# Patient Record
Sex: FEMALE | Race: OTHER | HISPANIC OR LATINO | URBAN - METROPOLITAN AREA
[De-identification: names, ages, dates, MRNs, and addresses within clinical notes are randomized per-mention and may not be internally consistent; named-entity substitution may affect disease eponyms.]

---

## 2021-07-21 ENCOUNTER — EMERGENCY (EMERGENCY)
Facility: HOSPITAL | Age: 36
LOS: 1 days | Discharge: HOME | End: 2021-07-21
Attending: EMERGENCY MEDICINE | Admitting: EMERGENCY MEDICINE
Payer: MEDICARE

## 2021-07-21 VITALS
DIASTOLIC BLOOD PRESSURE: 56 MMHG | SYSTOLIC BLOOD PRESSURE: 112 MMHG | HEART RATE: 83 BPM | TEMPERATURE: 97 F | OXYGEN SATURATION: 100 % | RESPIRATION RATE: 18 BRPM

## 2021-07-21 PROCEDURE — 99283 EMERGENCY DEPT VISIT LOW MDM: CPT

## 2021-07-21 RX ORDER — IBUPROFEN 200 MG
400 TABLET ORAL ONCE
Refills: 0 | Status: COMPLETED | OUTPATIENT
Start: 2021-07-21 | End: 2021-07-21

## 2021-07-21 RX ORDER — ACETAMINOPHEN 500 MG
650 TABLET ORAL ONCE
Refills: 0 | Status: COMPLETED | OUTPATIENT
Start: 2021-07-21 | End: 2021-07-21

## 2021-07-21 RX ADMIN — Medication 400 MILLIGRAM(S): at 14:42

## 2021-07-21 RX ADMIN — Medication 650 MILLIGRAM(S): at 14:42

## 2021-07-21 NOTE — ED PROVIDER NOTE - PHYSICAL EXAMINATION
Gen: AAOx3, non-toxic  Head: NCAT  HEENT: left eye upper and lower eyelid with ecchymosis, smooth eye movement, no entrapment, no corneal injury on wood's lamp exam.   Lung: CTAB, no respiratory distress, no wheezes/rhonchi/rales B/L, speaking in full sentences  CV: RRR, no murmurs, rubs or gallops  Abd: soft, NTND, no guarding, no CVA tenderness  MSK: no visible deformities  Neuro: No focal sensory or motor deficits, normal CN exam   Skin: Warm, well perfused, no rash  Psych: normal affect.
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
## 2021-07-21 NOTE — ED PROVIDER NOTE - PATIENT PORTAL LINK FT
You can access the FollowMyHealth Patient Portal offered by [facility] by registering at the following website: [email or web address]. By joining [email or web address] [email or web address] portal, you will also be able to view your health information using other applications (apps) compatible with our system.
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
## 2021-07-21 NOTE — ED PROVIDER NOTE - CLINICAL SUMMARY MEDICAL DECISION MAKING FREE TEXT BOX
24 yo M with no pmh, , presents after noticing left hand blisters with no fevers chills or discharge3 <1cm ruptured blisters on the L palm with no discharge or surrounding erythema. FROM. left eye upper and lower eyelid with ecchymosis, smooth eye movement, no entrapment, no corneal injury on wood's lamp exam.

## 2021-07-21 NOTE — ED PROVIDER NOTE - NS ED ROS FT
GENERAL: No fever or chills  EYES: see hpi  HEENT: no trouble swallowing or speaking  CARDIAC: no chest pain or palpiations   PULMONARY: no cough or SOB  GI: no abdominal pain, nausea, vomiting, diarrhea, or constipation   : No changes in urination  SKIN: no rashes  NEURO: no headache, numbness, or weakness.  MSK: No joint pain
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
## 2021-07-21 NOTE — ED PROVIDER NOTE - NSFOLLOWUPCLINICS_GEN_ALL_ED_FT
[facility]  Ophthalmology  [address] [place] [address], [facility] [address]  [place] [address]  Phone: [phone]  Fax:

## 2021-12-13 ENCOUNTER — EMERGENCY (EMERGENCY)
Facility: HOSPITAL | Age: 36
LOS: 1 days | Discharge: HOME | End: 2021-12-13
Attending: STUDENT IN AN ORGANIZED HEALTH CARE EDUCATION/TRAINING PROGRAM | Admitting: STUDENT IN AN ORGANIZED HEALTH CARE EDUCATION/TRAINING PROGRAM
Payer: MEDICARE

## 2021-12-13 VITALS
DIASTOLIC BLOOD PRESSURE: 79 MMHG | TEMPERATURE: 98 F | OXYGEN SATURATION: 100 % | HEART RATE: 83 BPM | SYSTOLIC BLOOD PRESSURE: 121 MMHG | RESPIRATION RATE: 15 BRPM

## 2021-12-13 PROCEDURE — 99283 EMERGENCY DEPT VISIT LOW MDM: CPT | Mod: GC

## 2021-12-13 NOTE — ED ADULT TRIAGE NOTE - CHIEF COMPLAINT QUOTE
Pt coming in from residential home accompanied by counselor. Pt. c/o dry rash to Left thigh. Denies any open skin or pain. PHx Down Syndrome and GERD.

## 2021-12-14 VITALS
HEART RATE: 83 BPM | OXYGEN SATURATION: 98 % | RESPIRATION RATE: 16 BRPM | DIASTOLIC BLOOD PRESSURE: 41 MMHG | SYSTOLIC BLOOD PRESSURE: 119 MMHG

## 2021-12-14 RX ORDER — CEPHALEXIN 500 MG
1 CAPSULE ORAL
Qty: 14 | Refills: 0
Start: 2021-12-14 | End: 2021-12-20

## 2021-12-14 RX ORDER — CEPHALEXIN 500 MG
500 CAPSULE ORAL ONCE
Refills: 0 | Status: COMPLETED | OUTPATIENT
Start: 2021-12-14 | End: 2021-12-14

## 2021-12-14 RX ADMIN — Medication 500 MILLIGRAM(S): at 00:41

## 2021-12-14 NOTE — ED PROVIDER NOTE - OBJECTIVE STATEMENT
36F pmhx of down syndrome and GERD presenting with CC of redness over the posterior red hip. No known injury, fall prior to onset. No fevers, chills, PT is ambulatory, not bed bound. No pain to the area. 36F pmhx of down syndrome and GERD presenting with CC of redness over the posterior red hip. No known injury, fall prior to onset. No fevers, chills, PT is ambulatory, not bed bound. No pain to the area. Pt poor historian and all history obtained by caregiver at bedside. No loss of function. No fevers or chills. No known trauma.

## 2021-12-14 NOTE — ED PROVIDER NOTE - CLINICAL SUMMARY MEDICAL DECISION MAKING FREE TEXT BOX
36F presenting with redness/irritated skin to the L posterior hip. On exam, VSS w early cellulitic changes on bedside US. Will tx w abx and give follow up instructions and return precautions. Daysi Smith, EM PGY3

## 2021-12-14 NOTE — ED ADULT NURSE NOTE - OBJECTIVE STATEMENT
patient aaox3. ambulatory. pmhx of down syndrome and GERD. presents with rash to right hip. came from [facility] with aid at bedside.

## 2021-12-14 NOTE — ED PROVIDER NOTE - PHYSICAL EXAMINATION
Const: Well-nourished, Well-developed, appearing stated age.  Eyes: no conjunctival injection, and symmetrical lids.  HEENT: Head NCAT, no lesions. Atraumatic external nose and ears.   Neck: Symmetric, trachea midline.   CVS: +S1/S2, Peripheral pulses 2+ and equal in all extremities.  RESP: Unlabored respiratory effort. Clear to auscultation bilaterally.  GI: Nontender/Nondistended, No CVA tenderness b/l.   MSK: Normocephalic/Atraumatic. L posterior hip w erythema about 10cm diameter, no induration or fluctuance or tenderness to palpation.   Skin: Warm, dry and intact.   Neuro: Fluent speech. Motor & Sensation grossly intact.  Psych: Awake, Alert, & Oriented (AAO) x3. Appropriate mood and affect. Const: Well-nourished, Well-developed, appearing stated age.  Eyes: no conjunctival injection, and symmetrical lids.  HEENT: Head NCAT, no lesions. Atraumatic external nose and ears.   Neck: Symmetric, trachea midline.   CVS: +S1/S2, Peripheral pulses 2+ and equal in all extremities.  RESP: Unlabored respiratory effort. Clear to auscultation bilaterally.  GI: Nontender/Nondistended, No CVA tenderness b/l.   MSK: Normocephalic/Atraumatic. L posterior hip w erythema about 10cm diameter, no induration or fluctuance or tenderness to palpation.   Skin: Warm, dry and intact. 10 cm x 8cm planar area of erythema with induration and scaling. No discharge.  Neuro: Fluent speech. Motor & Sensation grossly intact.  Psych: Awake, Alert, & Oriented (AAO) x3. Appropriate mood and affect.

## 2021-12-14 NOTE — ED PROVIDER NOTE - NS ED ROS FT
CONST: no fevers, no chills, no trauma  EYES: no pain, no blurry vision   ENT: no sore throat, no epistaxis, no rhinorrhea  CV: no chest pain, no palpitations, no orthopnea, no extremity pain or swelling  RESP: no shortness of breath, no cough, no sputum, no pleurisy, no wheezing  ABD: no abdominal pain, no nausea, no vomiting, no diarrhea, no black or bloody stool  : no dysuria, no hematuria, no frequency, no urgency  MSK: no back pain, no neck pain, no extremity pain  NEURO: no headache, no sensory disturbances, no focal weakness, no dizziness  HEME: no easy bleeding or bruising  SKIN: no diaphoresis, + rash

## 2021-12-14 NOTE — ED PROVIDER NOTE - PATIENT PORTAL LINK FT
You can access the FollowMyHealth Patient Portal offered by [facility] by registering at the following website: [email or web address]. By joining [employer]’s FollowMyHealth portal, you will also be able to view your health information using other applications (apps) compatible with our system.

## 2021-12-14 NOTE — ED PROVIDER NOTE - NSFOLLOWUPINSTRUCTIONS_ED_ALL_ED_FT
Please see attached information on skin infections.     Return to the ER for new or worsening redness, fevers, pain, or any other concerning symptoms.      your medications and take as directed.     Follow up with your PCP.

## 2021-12-14 NOTE — ED PROVIDER NOTE - ATTENDING CONTRIBUTION TO CARE
I have personally performed a history and physical examination of the patient and discussed management with the resident as well as the patient.  I reviewed the resident's note and agree with the documented findings and plan of care.  I have authored and modified critical sections of the Provider Note, including but not limited to HPI, Physical Exam and MDM.    36F pmhx of down syndrome and GERD presenting with CC of redness over the posterior red hip. No known injury, fall prior to onset. No fevers, chills, PT is ambulatory, not bed bound. Likely cellulitis, unlikely ulcer. No evidence of trauma. Abx and pcp follow up.

## 2022-01-23 ENCOUNTER — EMERGENCY (EMERGENCY)
Facility: HOSPITAL | Age: 37
LOS: 1 days | Discharge: HOME | End: 2022-01-23
Attending: STUDENT IN AN ORGANIZED HEALTH CARE EDUCATION/TRAINING PROGRAM | Admitting: STUDENT IN AN ORGANIZED HEALTH CARE EDUCATION/TRAINING PROGRAM
Payer: MEDICARE

## 2022-01-23 VITALS
DIASTOLIC BLOOD PRESSURE: 71 MMHG | SYSTOLIC BLOOD PRESSURE: 124 MMHG | TEMPERATURE: 99 F | HEART RATE: 102 BPM | OXYGEN SATURATION: 95 % | RESPIRATION RATE: 18 BRPM

## 2022-01-23 PROCEDURE — 99283 EMERGENCY DEPT VISIT LOW MDM: CPT | Mod: GC

## 2022-01-23 RX ORDER — HYDROCORTISONE 1 %
1 OINTMENT (GRAM) TOPICAL
Qty: 1 | Refills: 0
Start: 2022-01-23 | End: 2022-02-05

## 2022-01-23 NOTE — ED PROVIDER NOTE - CLINICAL SUMMARY MEDICAL DECISION MAKING FREE TEXT BOX
36yo F w/ hx down syndrome and GERD presenting with rash in R AC. Pruritic with small red spots, not blanching. Skin very dry in other parts of body. Likely eczema. Does not look infected or allergic.

## 2022-01-23 NOTE — ED PROVIDER NOTE - OBJECTIVE STATEMENT
36yo F w/ hx down syndrome and GERD presenting with rash. Caregiver noticed rash at 11am today, was in R arm in AC, was pruritic. Rash has not changed since onset. Pt allergic to tomato sauce but did not eat today. No fever or other symptoms.

## 2022-01-23 NOTE — ED ADULT TRIAGE NOTE - CHIEF COMPLAINT QUOTE
staff states "  she has rash on her right forearm with itching" patient denies any pain. h/o down syndrome, GERD, Psychosis.

## 2022-01-23 NOTE — ED PROVIDER NOTE - PHYSICAL EXAMINATION
General appearance: NAD, conversant, afebrile    Eyes: anicteric sclerae, MIMI, EOMI   HENT: Atraumatic; oropharynx clear, MMM and no ulcerations, no pharyngeal erythema or exudate   Neck: Trachea midline; Full range of motion, supple   Pulm: CTA bl, normal respiratory effort and no intercostal retractions, normal work of breathing   CV: RRR, No murmurs, rubs, or gallops. 2+ peripheral pulses.   Abdomen: Soft, non-tender, non-distended; no guarding or rebound   Extremities: No peripheral edema or extremity lymphadenopathy. 5/5 strength in all four extremities.   Skin: R AC has 5x4cm patch of erythema, with non blanching red spots. No ttp or raised lesions. overall skin is dry   Psych: Appropriate affect, cooperative; alert and oriented to person, place and time General appearance: NAD, conversant, afebrile    Eyes: anicteric sclerae, MIMI, EOMI   HENT: Atraumatic; oropharynx clear, MMM and no ulcerations, no pharyngeal erythema or exudate   Neck: Trachea midline; Full range of motion, supple   Pulm: CTA bl, normal respiratory effort and no intercostal retractions, normal work of breathing   CV: RRR, No murmurs, rubs, or gallops. 2+ peripheral pulses.   Abdomen: Soft, non-tender, non-distended; no guarding or rebound   Extremities: No peripheral edema or extremity lymphadenopathy. 5/5 strength in all four extremities.   Skin: R AC has 5x4cm patch of erythema, with non blanching erythematous scales. No ttp or raised lesions. overall skin is dry   Psych: Appropriate affect, cooperative; alert and oriented to person, place and time

## 2022-01-23 NOTE — ED PROVIDER NOTE - NSFOLLOWUPINSTRUCTIONS_ED_ALL_ED_FT
You were seen in the ER for a rash. The rash is likely due to eczema. It does not appear to be allergic or infected. Hydrocortisone cream was sent to your pharmacy. Please use it up to every 8 hours as needed.    Please follow up with your primary care doctor.    Please return to the ER if you have worsening symptoms including fever, chest pain, shortness of breath, nausea, vomiting, swelling, bleeding or drainage.

## 2022-01-23 NOTE — ED PROVIDER NOTE - ATTENDING CONTRIBUTION TO CARE
I have personally performed a history and physical examination of the patient and discussed management with the resident as well as the patient.  I reviewed the resident's note and agree with the documented findings and plan of care.  I have authored and modified critical sections of the Provider Note, including but not limited to HPI, Physical Exam and MDM.    36yo F w/ hx down syndrome and GERD presenting with rash. Caregiver noticed rash at 11am today, was in R arm in AC, was pruritic. Rash has not changed since onset. No recent exposures. No other residents in home with similar complaints. States that her skin has been very dry lately. Likely eczema. Low suspicion for infectious or allergic etiology. Will rx steroid cream and dermatology follow up.

## 2022-11-22 ENCOUNTER — EMERGENCY (EMERGENCY)
Facility: HOSPITAL | Age: 37
LOS: 1 days | Discharge: HOME | End: 2022-11-22
Attending: STUDENT IN AN ORGANIZED HEALTH CARE EDUCATION/TRAINING PROGRAM | Admitting: STUDENT IN AN ORGANIZED HEALTH CARE EDUCATION/TRAINING PROGRAM

## 2022-11-22 VITALS
OXYGEN SATURATION: 100 % | TEMPERATURE: 98 F | HEART RATE: 80 BPM | RESPIRATION RATE: 16 BRPM | DIASTOLIC BLOOD PRESSURE: 66 MMHG | SYSTOLIC BLOOD PRESSURE: 116 MMHG

## 2022-11-22 VITALS
DIASTOLIC BLOOD PRESSURE: 61 MMHG | OXYGEN SATURATION: 99 % | TEMPERATURE: 98 F | HEART RATE: 78 BPM | SYSTOLIC BLOOD PRESSURE: 115 MMHG | RESPIRATION RATE: 16 BRPM

## 2022-11-22 PROCEDURE — 99284 EMERGENCY DEPT VISIT MOD MDM: CPT

## 2022-11-22 NOTE — ED PROVIDER NOTE - PATIENT PORTAL LINK FT
You can access the FollowMyHealth Patient Portal offered by [facility] by registering at the following website: [email or web address]. By joining [employer]’s FollowMyHealth portal, you will also be able to view your health information using other applications (apps) compatible with our system.

## 2022-11-22 NOTE — ED ADULT NURSE NOTE - OBJECTIVE STATEMENT
pt at baseline metal status. ambulatory. NAD. c/o bump on chest. pt denies SOB, chest pain, dizziness, weakness, urinary symptoms, HA, n/v/d, fevers, chills. respirations are even and un labored. safety precautions maintained. AID at bedside.

## 2022-11-22 NOTE — ED PROVIDER NOTE - CONSTITUTIONAL NEGATIVE STATEMENT, MLM
Addended by: Idella Holstein on: 2/22/2021 05:34 PM     Modules accepted: Orders
no fever and no chills.

## 2022-11-22 NOTE — ED PROVIDER NOTE - NSFOLLOWUPINSTRUCTIONS_ED_ALL_ED_FT
Thank you for visiting our Emergency Department, it has been a pleasure taking part in your healthcare.    Your discharge diagnosis is: pustule  Please take all discharge medications as indicated below:  Please continue all medications as rx'd by your PMD.  Please follow up with your PMD within x48 hours.  A copy of resulted labs, imaging, and findings have been provided to you.   You have had a detailed discussion with your provider regarding your diagnosis, care management and discharge planning including, but not limited to: return precautions, follow up visits with existing or new providers, new prescriptions and/or medication changes, wound and/or splint/cast care or other care   aspects specific to your diagnosis and treatment. You have been given the opportunity to have your questions answered. At this time you have been deemed stable and fit for discharge.  Return precautions to the Emergency Department include but are not limited to: unrelenting nausea, vomiting, fever, chills, chest pain, shortness of breath, dizziness, chest or abdominal pain, worsening back pain, syncope, blood in urine or stool, headache that doesn't resolve, numbness or tingling, loss of sensation, loss of motor function, or any other concerning symptoms.      Please apply warm compresses to area, please apply topical antibiotics twice daily.

## 2022-11-22 NOTE — ED PROVIDER NOTE - OBJECTIVE STATEMENT
Mack MALDONADO: 37-year-old female with a history of Down syndrome GERD limited historian presents with a chief complaint of evaluation of small lesion in between breast on chest.  No other complaints.  No nausea no vomiting no chest pain no shortness of breath.  No rashes elsewhere.  Patient is a limited historian history is mostly obtained by her aide at bedside.  No other concerns per aide Mack MALDONADO: 37-year-old female with a history of Down syndrome GERD limited historian presents with a chief complaint of evaluation of small lesion in between breast on chest.  No other complaints.  No nausea no vomiting no chest pain no shortness of breath.  No rashes elsewhere.  Patient is a limited historian history is mostly obtained by her aide at bedside.  No other concerns per aide.

## 2022-11-22 NOTE — ED PROVIDER NOTE - CLINICAL SUMMARY MEDICAL DECISION MAKING FREE TEXT BOX
Mack MALDONADO: 37-year-old female with a history of Down syndrome GERD limited historian presents with a chief complaint of evaluation of small lesion in between breast on chest.  No other complaints.  No nausea no vomiting no chest pain no shortness of breath.  No rashes elsewhere Exam vital signs stable nontoxic-appearing with physical exam as above.  Exam was chaperoned by podiatrist Real.  DDx concern for likely small pustule in between breasts.  With active draining.  Does not appear superinfected.  Will encourage supportive care with warm compresses and topical antibiotics.  Strict return precautions with risk discussed.  No need for oral antibiotics at this time.

## 2022-11-22 NOTE — ED PROVIDER NOTE - PHYSICAL EXAMINATION
Mack MALDONADO:  VITALS: Initial triage and subsequent vitals have been reviewed by me.  GEN APPEARANCE: WDWN, alert and cooperative, non-toxic appearing and in NAD  HEAD: Atraumatic, normocephalic, Down facies  EYES: PERRLa, EOMI, vision grossly intact.   EARS: Gross hearing intact.   NOSE: No nasal discharge, no external evidence of epistaxis.   NECK: Supple  CV: RRR, S1S2, no c/r/m/g. No cyanosis. Extremities warm, well perfused. Cap refill <2 seconds. No bruits.   LUNGS: CTAB. No wheezing. No rales. No rhonchi. No diminished breath sounds.   ABDOMEN: Soft, NTND. No guarding or rebound. No masses.   MSK/EXT: Spine appears normal, no spine point tenderness. No CVA ttp. Normal muscular development. Pelvis stable. No obvious joint or bony deformity, no peripheral edema.   NEURO: Alert, follows commands. Weight bearing normal. Speech normal. Sensation and motor normal x4 extremities.   SKIN: Small less than 1 x 1 cm superficial pustule with active pus draining from lesion.  Likely a pimple

## 2022-11-22 NOTE — CHART NOTE - NSCHARTNOTEFT_GEN_A_CORE
SW informed by nurse patient ready for discharge back to group home. SW spoke with group home rep Ericka who is at bedside with patient. SW informed by Ericka that patient resides at [address] [place] [address]. SW arranged taxi for patient and group home rep Ericka through [facility] (rep Katrina),  at 10:15am with [employer] ([phone], invoice #[id number]). There are no further SW needs at this time.

## 2022-11-22 NOTE — ED ADULT TRIAGE NOTE - CHIEF COMPLAINT QUOTE
Patient c/o a " bump " on area of her chest.  Small red bump noted on her mid sternal area, no drainage noted.

## 2022-12-02 ENCOUNTER — EMERGENCY (EMERGENCY)
Facility: HOSPITAL | Age: 37
LOS: 1 days | Discharge: HOME | End: 2022-12-02
Attending: STUDENT IN AN ORGANIZED HEALTH CARE EDUCATION/TRAINING PROGRAM | Admitting: STUDENT IN AN ORGANIZED HEALTH CARE EDUCATION/TRAINING PROGRAM

## 2022-12-02 VITALS
RESPIRATION RATE: 16 BRPM | TEMPERATURE: 98 F | OXYGEN SATURATION: 100 % | DIASTOLIC BLOOD PRESSURE: 64 MMHG | HEART RATE: 79 BPM | SYSTOLIC BLOOD PRESSURE: 114 MMHG

## 2022-12-02 LAB
FLUAV AG NPH QL: SIGNIFICANT CHANGE UP
FLUBV AG NPH QL: SIGNIFICANT CHANGE UP
RSV RNA NPH QL NAA+NON-PROBE: SIGNIFICANT CHANGE UP
SARS-COV-2 RNA SPEC QL NAA+PROBE: DETECTED

## 2022-12-02 PROCEDURE — 99284 EMERGENCY DEPT VISIT MOD MDM: CPT | Mod: CS,GC

## 2022-12-02 NOTE — ED PROVIDER NOTE - NS ED ROS FT
Gen: Denies fevers  CV: Denies chest pain  Skin: +_axillary irritation, Rt   Resp: +cough  Endo: Denies increased urination  GI: Denies nausea, vomiting  Msk: Denies extremity pain  : Denies dysuria  Neuro: Denies LOC  Psych: Denies mood changes  all other ROS negative unless indicated in HPI

## 2022-12-02 NOTE — ED PROVIDER NOTE - PATIENT PORTAL LINK FT
You can access the FollowMyHealth Patient Portal offered by [facility] by registering at the following website: [email or web address]. By joining [employer]’s FollowMyHealth portal, you will also be able to view your health information using other applications (apps) compatible with our system.

## 2022-12-02 NOTE — ED PROVIDER NOTE - NSFOLLOWUPINSTRUCTIONS_ED_ALL_ED_FT
Follow up with your primary care doctor    Apply bacitracin / neosporin to affected area 2-3 times a day    If Covid returns positive, isolate x5 days, then continue to wear mask in public/around others for 5 days per CDC guidelines.    Return to ED with any new / worsening symptoms, worsening redness or pain to area, fever/chills, or anything else concerning to you.

## 2022-12-02 NOTE — ED PROVIDER NOTE - OBJECTIVE STATEMENT
37 F pmh down syndrome, GERD presenting to ED brought in by aid from [facility] human services group home for cough and irritation under Rt axilla x 1 day. States the cough has been mild for past couple days, not currently coughing, but multiple ill contacts at group home, requesting flu/covid test. Axillary irritation was first noticed yesterday. Worse with movement and when clothes rub against it. No drainage from area. No h/o similar issues. Denies fever, CP, SOB, abdominal pain, dysuria.

## 2022-12-02 NOTE — ED PROVIDER NOTE - PHYSICAL EXAMINATION
Gen: NAD  Skin: area rough skin with slightly raised erythema about 3 x2 cm with no area of fluctuance or purulence.   HEENT: EOMI, no nasal discharge, mucous membranes moist  CV: RRR, +S1/S2, no M/R/G, 2+ radial pulses b/l  Resp: CTAB, no W/R/R, no accessory muscle use, no increased work of breathing  GI: Abdomen soft non-distended, NTTP  MSK: No open wounds, no bruising, no LE edema  Neuro: A&Ox4, following commands, moving all four extremities spontaneously  Psych: appropriate mood

## 2022-12-02 NOTE — ED ADULT TRIAGE NOTE - RESPIRATORY RATE (BREATHS/MIN)
16 Detail Level: Detailed Depth Of Biopsy: dermis Was A Bandage Applied: Yes Size Of Lesion In Cm: 0.4 Biopsy Type: H and E Biopsy Method: double edge Personna blade Anesthesia Type: 1% lidocaine with epinephrine Anesthesia Volume In Cc (Will Not Render If 0): 0.5 Additional Anesthesia Volume In Cc (Will Not Render If 0): 0 Hemostasis: Aluminum Chloride Wound Care: Petrolatum Dressing: bandage Destruction After The Procedure: No Type Of Destruction Used: Curettage Curettage Text: The wound bed was treated with curettage after the biopsy was performed. Cryotherapy Text: The wound bed was treated with cryotherapy after the biopsy was performed. Electrodesiccation Text: The wound bed was treated with electrodesiccation after the biopsy was performed. Electrodesiccation And Curettage Text: The wound bed was treated with electrodesiccation and curettage after the biopsy was performed. Silver Nitrate Text: The wound bed was treated with silver nitrate after the biopsy was performed. Lab: 6 Lab Facility: 3 Consent: Written consent was obtained and risks were reviewed including but not limited to scarring, infection, bleeding, scabbing, incomplete removal, nerve damage and allergy to anesthesia. Post-Care Instructions: I reviewed with the patient in detail post-care instructions. Patient is to keep the biopsy site dry overnight, and then apply bacitracin twice daily until healed. Patient may apply hydrogen peroxide soaks to remove any crusting. Notification Instructions: Patient will be notified of biopsy results. However, patient instructed to call the office if not contacted within 2 weeks. Billing Type: Third-Party Bill Information: Selecting Yes will display possible errors in your note based on the variables you have selected. This validation is only offered as a suggestion for you. PLEASE NOTE THAT THE VALIDATION TEXT WILL BE REMOVED WHEN YOU FINALIZE YOUR NOTE. IF YOU WANT TO FAX A PRELIMINARY NOTE YOU WILL NEED TO TOGGLE THIS TO 'NO' IF YOU DO NOT WANT IT IN YOUR FAXED NOTE.

## 2022-12-02 NOTE — ED ADULT TRIAGE NOTE - CHIEF COMPLAINT QUOTE
brought in from [facility] group home by staff member for cough/rule out covid. pt reports pain under right axilla. as per staff, pt c/o cough and chest pain. pt only c/o right axillary pain at present. hx down syndrome, gerd

## 2022-12-02 NOTE — ED PROVIDER NOTE - CLINICAL SUMMARY MEDICAL DECISION MAKING FREE TEXT BOX
37 F with down syndrome and GERD brought in from group home for cough and axillary irritation. Cough is improving, not actively coughing, requesting covid/flu test since ill contacts at group home. lungs clear. vitals wnl. afebrile. Axillary irritation likely from friction with deodorant application v clothing v bra rubbing against skin. No area of fluctuance or areas to drain. Likely friction irritation v dermatitis. Plan to covid swab and apply bacitracin to area.

## 2022-12-02 NOTE — ED PROVIDER NOTE - ATTENDING CONTRIBUTION TO CARE
37F h/o Down Syndrome, GERD p/w cough and irritation under R axilla x 1 day. States the cough has been mild for past couple days, resolved today but others in group home with continued cough . R axilla with minor erythema, pain with movement and local irritation. No drainage of swelling. No direct trauma. No fever/chills. No h/o similar issues.   Gen: nad  CV: rrr, no appreciable murmur  Pulm: clear lungs  Abd: soft, ntnd  Skin: minor abrasion to R axilla with rough skin with slightly raised erythema about 3 x2 cm with no area of fluctuance or purulence  MDM: 37F h/o Down Syndrome, GERD p/w cough and irritation under R axilla x 1 day. Cough currently resolved, lungs clear - no indication for imaging. Will get RVP per staff member request. R axilla area likely local irritaiton, no evidence of cellulitis or abscess. Will apply bacitracin

## 2022-12-03 NOTE — ED POST DISCHARGE NOTE - REASON FOR FOLLOW-UP
Other COVID-19 : detected Patient contact # [phone] wrong # alt # [phone] message left with Call Back  P.A. number and hours for return call back.

## 2023-12-06 ENCOUNTER — EMERGENCY (EMERGENCY)
Facility: HOSPITAL | Age: 38
LOS: 1 days | Discharge: HOME | End: 2023-12-06
Attending: EMERGENCY MEDICINE | Admitting: EMERGENCY MEDICINE
Payer: MEDICARE

## 2023-12-06 VITALS
HEART RATE: 63 BPM | OXYGEN SATURATION: 98 % | TEMPERATURE: 98 F | SYSTOLIC BLOOD PRESSURE: 121 MMHG | DIASTOLIC BLOOD PRESSURE: 96 MMHG | RESPIRATION RATE: 18 BRPM

## 2023-12-06 VITALS
SYSTOLIC BLOOD PRESSURE: 130 MMHG | TEMPERATURE: 98 F | OXYGEN SATURATION: 98 % | RESPIRATION RATE: 18 BRPM | DIASTOLIC BLOOD PRESSURE: 82 MMHG | HEART RATE: 85 BPM

## 2023-12-06 PROBLEM — K21.9 GASTRO-ESOPHAGEAL REFLUX DISEASE WITHOUT ESOPHAGITIS: Chronic | Status: ACTIVE | Noted: 2022-12-02

## 2023-12-06 PROBLEM — Q90.9 DOWN SYNDROME, UNSPECIFIED: Chronic | Status: ACTIVE | Noted: 2022-12-02

## 2023-12-06 LAB
ALBUMIN SERPL ELPH-MCNC: 3.7 G/DL — SIGNIFICANT CHANGE UP (ref 3.3–5)
ALBUMIN SERPL ELPH-MCNC: 3.7 G/DL — SIGNIFICANT CHANGE UP (ref 3.3–5)
ALP SERPL-CCNC: 101 U/L — SIGNIFICANT CHANGE UP (ref 40–120)
ALP SERPL-CCNC: 101 U/L — SIGNIFICANT CHANGE UP (ref 40–120)
ALT FLD-CCNC: 34 U/L — HIGH (ref 4–33)
ALT FLD-CCNC: 34 U/L — HIGH (ref 4–33)
ANION GAP SERPL CALC-SCNC: 10 MMOL/L — SIGNIFICANT CHANGE UP (ref 7–14)
ANION GAP SERPL CALC-SCNC: 10 MMOL/L — SIGNIFICANT CHANGE UP (ref 7–14)
APPEARANCE UR: CLEAR — SIGNIFICANT CHANGE UP
APPEARANCE UR: CLEAR — SIGNIFICANT CHANGE UP
AST SERPL-CCNC: 23 U/L — SIGNIFICANT CHANGE UP (ref 4–32)
AST SERPL-CCNC: 23 U/L — SIGNIFICANT CHANGE UP (ref 4–32)
BACTERIA # UR AUTO: NEGATIVE /HPF — SIGNIFICANT CHANGE UP
BACTERIA # UR AUTO: NEGATIVE /HPF — SIGNIFICANT CHANGE UP
BASOPHILS # BLD AUTO: 0 K/UL — SIGNIFICANT CHANGE UP (ref 0–0.2)
BASOPHILS # BLD AUTO: 0 K/UL — SIGNIFICANT CHANGE UP (ref 0–0.2)
BASOPHILS NFR BLD AUTO: 0 % — SIGNIFICANT CHANGE UP (ref 0–2)
BASOPHILS NFR BLD AUTO: 0 % — SIGNIFICANT CHANGE UP (ref 0–2)
BILIRUB SERPL-MCNC: 0.5 MG/DL — SIGNIFICANT CHANGE UP (ref 0.2–1.2)
BILIRUB SERPL-MCNC: 0.5 MG/DL — SIGNIFICANT CHANGE UP (ref 0.2–1.2)
BILIRUB UR-MCNC: NEGATIVE — SIGNIFICANT CHANGE UP
BILIRUB UR-MCNC: NEGATIVE — SIGNIFICANT CHANGE UP
BUN SERPL-MCNC: 14 MG/DL — SIGNIFICANT CHANGE UP (ref 7–23)
BUN SERPL-MCNC: 14 MG/DL — SIGNIFICANT CHANGE UP (ref 7–23)
CALCIUM SERPL-MCNC: 9.3 MG/DL — SIGNIFICANT CHANGE UP (ref 8.4–10.5)
CALCIUM SERPL-MCNC: 9.3 MG/DL — SIGNIFICANT CHANGE UP (ref 8.4–10.5)
CAST: 0 /LPF — SIGNIFICANT CHANGE UP (ref 0–4)
CAST: 0 /LPF — SIGNIFICANT CHANGE UP (ref 0–4)
CHLORIDE SERPL-SCNC: 105 MMOL/L — SIGNIFICANT CHANGE UP (ref 98–107)
CHLORIDE SERPL-SCNC: 105 MMOL/L — SIGNIFICANT CHANGE UP (ref 98–107)
CO2 SERPL-SCNC: 28 MMOL/L — SIGNIFICANT CHANGE UP (ref 22–31)
CO2 SERPL-SCNC: 28 MMOL/L — SIGNIFICANT CHANGE UP (ref 22–31)
COLOR SPEC: YELLOW — SIGNIFICANT CHANGE UP
COLOR SPEC: YELLOW — SIGNIFICANT CHANGE UP
CREAT SERPL-MCNC: 0.82 MG/DL — SIGNIFICANT CHANGE UP (ref 0.5–1.3)
CREAT SERPL-MCNC: 0.82 MG/DL — SIGNIFICANT CHANGE UP (ref 0.5–1.3)
DIFF PNL FLD: NEGATIVE — SIGNIFICANT CHANGE UP
DIFF PNL FLD: NEGATIVE — SIGNIFICANT CHANGE UP
EGFR: 94 ML/MIN/1.73M2 — SIGNIFICANT CHANGE UP
EGFR: 94 ML/MIN/1.73M2 — SIGNIFICANT CHANGE UP
EOSINOPHIL # BLD AUTO: 0.06 K/UL — SIGNIFICANT CHANGE UP (ref 0–0.5)
EOSINOPHIL # BLD AUTO: 0.06 K/UL — SIGNIFICANT CHANGE UP (ref 0–0.5)
EOSINOPHIL NFR BLD AUTO: 0.9 % — SIGNIFICANT CHANGE UP (ref 0–6)
EOSINOPHIL NFR BLD AUTO: 0.9 % — SIGNIFICANT CHANGE UP (ref 0–6)
GLUCOSE SERPL-MCNC: 80 MG/DL — SIGNIFICANT CHANGE UP (ref 70–99)
GLUCOSE SERPL-MCNC: 80 MG/DL — SIGNIFICANT CHANGE UP (ref 70–99)
GLUCOSE UR QL: NEGATIVE MG/DL — SIGNIFICANT CHANGE UP
GLUCOSE UR QL: NEGATIVE MG/DL — SIGNIFICANT CHANGE UP
HCG SERPL-ACNC: <1 MIU/ML — SIGNIFICANT CHANGE UP
HCG SERPL-ACNC: <1 MIU/ML — SIGNIFICANT CHANGE UP
HCT VFR BLD CALC: 45.4 % — HIGH (ref 34.5–45)
HCT VFR BLD CALC: 45.4 % — HIGH (ref 34.5–45)
HGB BLD-MCNC: 14.8 G/DL — SIGNIFICANT CHANGE UP (ref 11.5–15.5)
HGB BLD-MCNC: 14.8 G/DL — SIGNIFICANT CHANGE UP (ref 11.5–15.5)
IANC: 4.66 K/UL — SIGNIFICANT CHANGE UP (ref 1.8–7.4)
IANC: 4.66 K/UL — SIGNIFICANT CHANGE UP (ref 1.8–7.4)
KETONES UR-MCNC: NEGATIVE MG/DL — SIGNIFICANT CHANGE UP
KETONES UR-MCNC: NEGATIVE MG/DL — SIGNIFICANT CHANGE UP
LEUKOCYTE ESTERASE UR-ACNC: NEGATIVE — SIGNIFICANT CHANGE UP
LEUKOCYTE ESTERASE UR-ACNC: NEGATIVE — SIGNIFICANT CHANGE UP
LYMPHOCYTES # BLD AUTO: 1.01 K/UL — SIGNIFICANT CHANGE UP (ref 1–3.3)
LYMPHOCYTES # BLD AUTO: 1.01 K/UL — SIGNIFICANT CHANGE UP (ref 1–3.3)
LYMPHOCYTES # BLD AUTO: 15.2 % — SIGNIFICANT CHANGE UP (ref 13–44)
LYMPHOCYTES # BLD AUTO: 15.2 % — SIGNIFICANT CHANGE UP (ref 13–44)
MCHC RBC-ENTMCNC: 30 PG — SIGNIFICANT CHANGE UP (ref 27–34)
MCHC RBC-ENTMCNC: 30 PG — SIGNIFICANT CHANGE UP (ref 27–34)
MCHC RBC-ENTMCNC: 32.6 GM/DL — SIGNIFICANT CHANGE UP (ref 32–36)
MCHC RBC-ENTMCNC: 32.6 GM/DL — SIGNIFICANT CHANGE UP (ref 32–36)
MCV RBC AUTO: 92.1 FL — SIGNIFICANT CHANGE UP (ref 80–100)
MCV RBC AUTO: 92.1 FL — SIGNIFICANT CHANGE UP (ref 80–100)
MONOCYTES # BLD AUTO: 0.59 K/UL — SIGNIFICANT CHANGE UP (ref 0–0.9)
MONOCYTES # BLD AUTO: 0.59 K/UL — SIGNIFICANT CHANGE UP (ref 0–0.9)
MONOCYTES NFR BLD AUTO: 8.9 % — SIGNIFICANT CHANGE UP (ref 2–14)
MONOCYTES NFR BLD AUTO: 8.9 % — SIGNIFICANT CHANGE UP (ref 2–14)
NEUTROPHILS # BLD AUTO: 4.44 K/UL — SIGNIFICANT CHANGE UP (ref 1.8–7.4)
NEUTROPHILS # BLD AUTO: 4.44 K/UL — SIGNIFICANT CHANGE UP (ref 1.8–7.4)
NEUTROPHILS NFR BLD AUTO: 67 % — SIGNIFICANT CHANGE UP (ref 43–77)
NEUTROPHILS NFR BLD AUTO: 67 % — SIGNIFICANT CHANGE UP (ref 43–77)
NITRITE UR-MCNC: NEGATIVE — SIGNIFICANT CHANGE UP
NITRITE UR-MCNC: NEGATIVE — SIGNIFICANT CHANGE UP
PH UR: 7.5 — SIGNIFICANT CHANGE UP (ref 5–8)
PH UR: 7.5 — SIGNIFICANT CHANGE UP (ref 5–8)
PLATELET # BLD AUTO: 270 K/UL — SIGNIFICANT CHANGE UP (ref 150–400)
PLATELET # BLD AUTO: 270 K/UL — SIGNIFICANT CHANGE UP (ref 150–400)
POTASSIUM SERPL-MCNC: 4.1 MMOL/L — SIGNIFICANT CHANGE UP (ref 3.5–5.3)
POTASSIUM SERPL-MCNC: 4.1 MMOL/L — SIGNIFICANT CHANGE UP (ref 3.5–5.3)
POTASSIUM SERPL-SCNC: 4.1 MMOL/L — SIGNIFICANT CHANGE UP (ref 3.5–5.3)
POTASSIUM SERPL-SCNC: 4.1 MMOL/L — SIGNIFICANT CHANGE UP (ref 3.5–5.3)
PROT SERPL-MCNC: 7.3 G/DL — SIGNIFICANT CHANGE UP (ref 6–8.3)
PROT SERPL-MCNC: 7.3 G/DL — SIGNIFICANT CHANGE UP (ref 6–8.3)
PROT UR-MCNC: NEGATIVE MG/DL — SIGNIFICANT CHANGE UP
PROT UR-MCNC: NEGATIVE MG/DL — SIGNIFICANT CHANGE UP
RBC # BLD: 4.93 M/UL — SIGNIFICANT CHANGE UP (ref 3.8–5.2)
RBC # BLD: 4.93 M/UL — SIGNIFICANT CHANGE UP (ref 3.8–5.2)
RBC # FLD: 15.1 % — HIGH (ref 10.3–14.5)
RBC # FLD: 15.1 % — HIGH (ref 10.3–14.5)
RBC CASTS # UR COMP ASSIST: 1 /HPF — SIGNIFICANT CHANGE UP (ref 0–4)
RBC CASTS # UR COMP ASSIST: 1 /HPF — SIGNIFICANT CHANGE UP (ref 0–4)
SODIUM SERPL-SCNC: 143 MMOL/L — SIGNIFICANT CHANGE UP (ref 135–145)
SODIUM SERPL-SCNC: 143 MMOL/L — SIGNIFICANT CHANGE UP (ref 135–145)
SP GR SPEC: 1.02 — SIGNIFICANT CHANGE UP (ref 1–1.03)
SP GR SPEC: 1.02 — SIGNIFICANT CHANGE UP (ref 1–1.03)
SQUAMOUS # UR AUTO: 0 /HPF — SIGNIFICANT CHANGE UP (ref 0–5)
SQUAMOUS # UR AUTO: 0 /HPF — SIGNIFICANT CHANGE UP (ref 0–5)
UROBILINOGEN FLD QL: 0.2 MG/DL — SIGNIFICANT CHANGE UP (ref 0.2–1)
UROBILINOGEN FLD QL: 0.2 MG/DL — SIGNIFICANT CHANGE UP (ref 0.2–1)
WBC # BLD: 6.62 K/UL — SIGNIFICANT CHANGE UP (ref 3.8–10.5)
WBC # BLD: 6.62 K/UL — SIGNIFICANT CHANGE UP (ref 3.8–10.5)
WBC # FLD AUTO: 6.62 K/UL — SIGNIFICANT CHANGE UP (ref 3.8–10.5)
WBC # FLD AUTO: 6.62 K/UL — SIGNIFICANT CHANGE UP (ref 3.8–10.5)
WBC UR QL: 0 /HPF — SIGNIFICANT CHANGE UP (ref 0–5)
WBC UR QL: 0 /HPF — SIGNIFICANT CHANGE UP (ref 0–5)

## 2023-12-06 PROCEDURE — 74177 CT ABD & PELVIS W/CONTRAST: CPT | Mod: 26,MA

## 2023-12-06 PROCEDURE — 99285 EMERGENCY DEPT VISIT HI MDM: CPT

## 2023-12-06 RX ORDER — SODIUM CHLORIDE 9 MG/ML
1000 INJECTION INTRAMUSCULAR; INTRAVENOUS; SUBCUTANEOUS ONCE
Refills: 0 | Status: COMPLETED | OUTPATIENT
Start: 2023-12-06 | End: 2023-12-06

## 2023-12-06 RX ORDER — ACETAMINOPHEN 500 MG
1000 TABLET ORAL ONCE
Refills: 0 | Status: COMPLETED | OUTPATIENT
Start: 2023-12-06 | End: 2023-12-06

## 2023-12-06 RX ADMIN — SODIUM CHLORIDE 1000 MILLILITER(S): 9 INJECTION INTRAMUSCULAR; INTRAVENOUS; SUBCUTANEOUS at 14:31

## 2023-12-06 RX ADMIN — SODIUM CHLORIDE 1000 MILLILITER(S): 9 INJECTION INTRAMUSCULAR; INTRAVENOUS; SUBCUTANEOUS at 12:35

## 2023-12-06 RX ADMIN — Medication 400 MILLIGRAM(S): at 09:57

## 2023-12-06 NOTE — ED PROVIDER NOTE - PHYSICAL EXAMINATION
Physical Exam:  General: NAD, Conversive  Eyes: EOMI, Conjunctiva and sclera clear  Neck: No JVD  Lungs: Clear to auscultation bilaterally, no wheeze, no rhonchi  Heart: Normal S1, S2, no murmurs  Abdomen: Soft, + LLQ pain, + L. CVA tenderness  Extremities: 2+ peripheral pulses, no edema  Psych: AAO X3  Neurologic: Non-focal

## 2023-12-06 NOTE — ED ADULT TRIAGE NOTE - CHIEF COMPLAINT QUOTE
pt with hx Down syndrome sent from group home for eval, lt hip pain post unwitnessed mechanical fall this am, neg LOC, pt ambulatory, denies blood thinner use

## 2023-12-06 NOTE — ED PROVIDER NOTE - ATTENDING CONTRIBUTION TO CARE
RAYRAY: 38-year-old female history of Down syndrome presents from group home accompanied by staff complaining of L hip/lower back/lower quadrant pain 1 day, minimal left CVA tenderness and LLQ TTP, no r/g. Pt well appearing afeb, no pain with LLE movement, no swelling or TTP to L hip/knee, no midline spinal TTP.  Pt also reporting some burning with urination-DDx includes ruptured ovarian cyst, diverticulitis, kidney stone, pyelonephritis - plan for labs, pain control, CTAP. After speaking with , they clarify that patient NEVER fell (as triage note suggests), pt also denies any fall. No c/f hip fracture or septic joint as pt will full ROM and able to bear weight.

## 2023-12-06 NOTE — ED PROVIDER NOTE - CLINICAL SUMMARY MEDICAL DECISION MAKING FREE TEXT BOX
38-year-old female history of Down syndrome, reported poor historian with past, complaining of left lower quadrant pain, with left CVA tenderness primary concern for primary concern for urinary tract infection vs. pylonephritis, in setting of poor history and + PE, will perform CT Abd/Pelvis for evaluation for abscess, diverticulitis, colitis. c 38 Y F H/O ASD presenting with LLQ pain vs hip pain, able to ambulate well, no focal bony tenderness, mild LLQ pain, primary concern for R/O UTI, CT ABD/PELVIS to assess for acute intraabdominal process. Dispo pending results.

## 2023-12-06 NOTE — ED PROVIDER NOTE - NS ED ROS FT
GENERAL: No fever or chills  EYES: No change in vision  HEENT: No trouble swallowing or speaking  CARDIAC: No chest pain  PULMONARY: No cough or SOB  GI:+ LLQ pain, no nausea or no vomiting, no diarrhea or constipation  : + dysuria  SKIN: No rashes  NEURO: No headache, no numbness  MSK: No joint pain  Otherwise as HPI or negative.

## 2023-12-06 NOTE — ED PROVIDER NOTE - NSFOLLOWUPINSTRUCTIONS_ED_ALL_ED_FT
Abdominal Pain    Many things can cause abdominal pain. Many times, abdominal pain is not caused by a disease and will improve without treatment. Your health care provider will do a physical exam to determine if there is a dangerous cause of your pain; blood tests and imaging may help determine the cause of your pain. However, in many cases, no cause may be found and you may need further testing as an outpatient. Monitor your abdominal pain for any changes.     No acute findings were observed on our blood work or imaging, Take Motrin 400mg every 8hrs with food for pain, alternative with acetaminophen 1000 mg every 8 hours.     SEEK IMMEDIATE MEDICAL CARE IF YOU HAVE ANY OF THE FOLLOWING SYMPTOMS: worsening abdominal pain, uncontrollable vomiting, profuse diarrhea, inability to have bowel movements or pass gas, black or bloody stools, fever accompanying chest pain or back pain, or fainting. These symptoms may represent a serious problem that is an emergency. Do not wait to see if the symptoms will go away. Get medical help right away. Call 911 and do not drive yourself to the hospital.

## 2023-12-06 NOTE — ED PROVIDER NOTE - PROGRESS NOTE DETAILS
Nelson Lange MD:  UA negative, BW nonactionable, CT negative, will DC with recommendations for pain control

## 2023-12-06 NOTE — ED ADULT NURSE NOTE - NSFALLRISKINTERV_ED_ALL_ED
Assistance OOB with selected safe patient handling equipment if applicable/Assistance with ambulation/Communicate fall risk and risk factors to all staff, patient, and family/Monitor gait and stability/Provide visual cue: yellow wristband, yellow gown, etc/Reinforce activity limits and safety measures with patient and family/Call bell, personal items and telephone in reach/Instruct patient to call for assistance before getting out of bed/chair/stretcher/Non-slip footwear applied when patient is off stretcher/[place] to call system/Physically safe environment - no spills, clutter or unnecessary equipment/Purposeful Proactive Rounding/Room/bathroom lighting operational, light cord in reach Assistance OOB with selected safe patient handling equipment if applicable/Assistance with ambulation/Communicate fall risk and risk factors to all staff, patient, and family/Monitor gait and stability/Provide visual cue: yellow wristband, yellow gown, etc/Reinforce activity limits and safety measures with patient and family/Call bell, personal items and telephone in reach/Instruct patient to call for assistance before getting out of bed/chair/stretcher/Non-slip footwear applied when patient is off stretcher/[place] to call system/Physically safe environment - no spills, clutter or unnecessary equipment/Purposeful Proactive Rounding/Room/bathroom lighting operational, light cord in reach

## 2023-12-06 NOTE — ED PROVIDER NOTE - PATIENT PORTAL LINK FT
You can access the FollowMyHealth Patient Portal offered by [facility] by registering at the following website: [email or web address]. By joining [employer]’s FollowMyHealth portal, you will also be able to view your health information using other applications (apps) compatible with our system. You can access the FollowMyHealth Patient Portal offered by [facility] by registering at the following website: [email or web address]. By joining [employer]’s FollowMyHealth portal, you will also be able to view your health information using other applications (apps) compatible with our system.

## 2023-12-06 NOTE — ED PROVIDER NOTE - OBJECTIVE STATEMENT
38-year-old female history of Down syndrome, GERD presenting, GERD presenting with left lower quadrant/quadrant/hip pain.  Per primary caretaker states patient complained of left hip pain this morning.  Patient states positive dysuria, negative for chest pain, difficulty breathing, other abdominal pain, constipation, denies any difficulty ambulating, patient and caretakers denies any trauma, in discontinuity with triage note.

## 2023-12-06 NOTE — ED ADULT NURSE NOTE - OBJECTIVE STATEMENT
Pt A+Ox1-2.  Pt is a poor historian.  Pt states she fell and now has left hip pain.  Pts aide states she has 24hr supervision and is never alone.  Pts aide states no fall was reported.  Pt started to have left hip pain this morning.  Pt is unable to verbalize how her hip started to hurt.  IV placed right AC#20.  Pt awaiting lab results and xray.
[id number]

## 2023-12-08 LAB
CULTURE RESULTS: SIGNIFICANT CHANGE UP
CULTURE RESULTS: SIGNIFICANT CHANGE UP
SPECIMEN SOURCE: SIGNIFICANT CHANGE UP
SPECIMEN SOURCE: SIGNIFICANT CHANGE UP

## 2024-12-23 NOTE — ED PROVIDER NOTE - PATIENT PORTAL LINK FT
You can access the FollowMyHealth Patient Portal offered by [facility] by registering at the following website: [email or web address]. By joining [employer]’s FollowMyHealth portal, you will also be able to view your health information using other applications (apps) compatible with our system.
Never smoker